# Patient Record
Sex: FEMALE | Race: WHITE | NOT HISPANIC OR LATINO
[De-identification: names, ages, dates, MRNs, and addresses within clinical notes are randomized per-mention and may not be internally consistent; named-entity substitution may affect disease eponyms.]

---

## 2017-06-17 ENCOUNTER — HEALTH MAINTENANCE LETTER (OUTPATIENT)
Age: 37
End: 2017-06-17

## 2018-05-24 ENCOUNTER — RECORDS - HEALTHEAST (OUTPATIENT)
Dept: LAB | Facility: CLINIC | Age: 38
End: 2018-05-24

## 2018-05-26 LAB — BACTERIA SPEC CULT: NORMAL

## 2019-02-18 ENCOUNTER — TELEPHONE (OUTPATIENT)
Dept: FAMILY MEDICINE | Facility: CLINIC | Age: 39
End: 2019-02-18

## 2019-02-18 ENCOUNTER — OFFICE VISIT (OUTPATIENT)
Dept: FAMILY MEDICINE | Facility: CLINIC | Age: 39
End: 2019-02-18

## 2019-02-18 ENCOUNTER — TRANSFERRED RECORDS (OUTPATIENT)
Dept: FAMILY MEDICINE | Facility: CLINIC | Age: 39
End: 2019-02-18

## 2019-02-18 VITALS
TEMPERATURE: 98.4 F | OXYGEN SATURATION: 98 % | HEIGHT: 62 IN | HEART RATE: 93 BPM | SYSTOLIC BLOOD PRESSURE: 124 MMHG | BODY MASS INDEX: 26.65 KG/M2 | DIASTOLIC BLOOD PRESSURE: 74 MMHG | WEIGHT: 144.8 LBS

## 2019-02-18 DIAGNOSIS — R03.0 ELEVATED BLOOD PRESSURE READING WITHOUT DIAGNOSIS OF HYPERTENSION: ICD-10-CM

## 2019-02-18 DIAGNOSIS — Z01.419 ENCOUNTER FOR GYNECOLOGICAL EXAMINATION WITHOUT ABNORMAL FINDING: Primary | ICD-10-CM

## 2019-02-18 DIAGNOSIS — E66.3 OVERWEIGHT: ICD-10-CM

## 2019-02-18 PROBLEM — Z71.89 ACP (ADVANCE CARE PLANNING): Status: ACTIVE | Noted: 2019-02-18

## 2019-02-18 PROCEDURE — 99386 PREV VISIT NEW AGE 40-64: CPT | Performed by: PHYSICIAN ASSISTANT

## 2019-02-18 SDOH — HEALTH STABILITY: MENTAL HEALTH: HOW OFTEN DO YOU HAVE A DRINK CONTAINING ALCOHOL?: 2-4 TIMES A MONTH

## 2019-02-18 SDOH — HEALTH STABILITY: MENTAL HEALTH: HOW MANY STANDARD DRINKS CONTAINING ALCOHOL DO YOU HAVE ON A TYPICAL DAY?: 1 OR 2

## 2019-02-18 ASSESSMENT — MIFFLIN-ST. JEOR: SCORE: 1272.12

## 2019-02-18 NOTE — PROGRESS NOTES
SUBJECTIVE:   CC: Mireya Pavon is an 39 year old woman who presents for preventive health visit.     Healthy Habits:    Do you get at least three servings of calcium containing foods daily (dairy, green leafy vegetables, etc.)? yes    Amount of exercise or daily activities, outside of work: soccer, basketball coaching, frisbee, running, water skiing    Problems taking medications regularly not applicable    Medication side effects: No    Have you had an eye exam in the past two years? no    Do you see a dentist twice per year? yes      Pt has IUD - 2 years ago put in at [facility]   No menses      -------------------------------------    Today's PHQ-2 Score:   PHQ-2 ( 1999 Pfizer) 2/18/2019   Q1: Little interest or pleasure in doing things 0   Q2: Feeling down, depressed or hopeless 0   PHQ-2 Score 0       Abuse: Current or Past(Physical, Sexual or Emotional)- No  Do you feel safe in your environment? Yes    Social History     Tobacco Use     Smoking status: Never Smoker     Smokeless tobacco: Never Used   Substance Use Topics     Alcohol use: Yes     Alcohol/week: 0.5 oz     Types: 1 Standard drinks or equivalent per week     Frequency: 2-4 times a month     Drinks per session: 1 or 2     If you drink alcohol do you typically have >3 drinks per day or >7 drinks per week? No                     Reviewed orders with patient.  Reviewed health maintenance and updated orders accordingly - Yes  Labs reviewed in EPIC  BP Readings from Last 3 Encounters:   02/18/19 124/74   12/26/14 (!) 142/98   11/29/12 110/62    Wt Readings from Last 3 Encounters:   02/18/19 65.7 kg (144 lb 12.8 oz)   12/26/14 63.5 kg (140 lb)   11/29/12 64.4 kg (142 lb)                  Patient Active Problem List   Diagnosis     Health Care Home     ACP (advance care planning)     Elevated blood pressure reading without diagnosis of hypertension     Overweight     Past Surgical History:   Procedure Laterality Date     wisdom teeth          Social History     Tobacco Use     Smoking status: Never Smoker     Smokeless tobacco: Never Used   Substance Use Topics     Alcohol use: Yes     Alcohol/week: 0.5 oz     Types: 1 Standard drinks or equivalent per week     Frequency: 2-4 times a month     Drinks per session: 1 or 2     Family History   Problem Relation Age of Onset     No Known Problems Brother      Breast Cancer Mother 41     High cholesterol Father      Diabetes Paternal Grandmother      Allergies Daughter      Asthma Daughter          Current Outpatient Medications   Medication Sig Dispense Refill     levonorgestrel (MIRENA) 20 MCG/24HR IUD 1 Device by Intrauterine route       No Known Allergies  Recent Labs   Lab Test 11/29/12  1817   ALT 7   CR 0.71   GFRESTIMATED 112   POTASSIUM 4.2        Mammogram Screening: Patient under age 50, mutual decision reflected in health maintenance.      Pertinent mammograms are reviewed under the imaging tab.  History of abnormal Pap smear: NO - age 30- 65 PAP every 3 years recommended     Reviewed and updated as needed this visit by clinical staff  Tobacco  Allergies  Meds  Problems         Reviewed and updated as needed this visit by Provider        No past medical history on file.   Past Surgical History:   Procedure Laterality Date     wisdom teeth         ROS:  CONSTITUTIONAL: NEGATIVE for fever, chills, change in weight  INTEGUMENTARU/SKIN: NEGATIVE for worrisome rashes, moles or lesions  EYES: NEGATIVE for vision changes or irritation  ENT: NEGATIVE for ear, mouth and throat problems  RESP: NEGATIVE for significant cough or SOB  BREAST: NEGATIVE for masses, tenderness or discharge  CV: NEGATIVE for chest pain, palpitations or peripheral edema  GI: NEGATIVE for nausea, abdominal pain, heartburn, or change in bowel habits  : NEGATIVE for unusual urinary or vaginal symptoms. Periods are regular.  MUSCULOSKELETAL: NEGATIVE for significant arthralgias or myalgia  NEURO: NEGATIVE for weakness, dizziness  "or paresthesias  PSYCHIATRIC: NEGATIVE for changes in mood or affect    OBJECTIVE:   /74 (BP Location: Left arm, Patient Position: Sitting, Cuff Size: Adult Large)   Pulse 93   Temp 98.4  F (36.9  C) (Oral)   Ht 1.562 m (5' 1.5\")   Wt 65.7 kg (144 lb 12.8 oz)   SpO2 98%   BMI 26.92 kg/m       EXAM:  GENERAL: healthy, alert and no distress  EYES: Eyes grossly normal to inspection, PERRL and conjunctivae and sclerae normal  HENT: ear canals and TM's normal, nose and mouth without ulcers or lesions  NECK: no adenopathy, no asymmetry, masses, or scars and thyroid normal to palpation  RESP: lungs clear to auscultation - no rales, rhonchi or wheezes  BREAST: normal without masses, tenderness or nipple discharge and no palpable axillary masses or adenopathy  CV: regular rate and rhythm, normal S1 S2, no S3 or S4, no murmur, click or rub, no peripheral edema and peripheral pulses strong  ABDOMEN: soft, nontender, no hepatosplenomegaly, no masses and bowel sounds normal   (female): normal female external genitalia, normal urethral meatus, vaginal mucosa pink, moist, well rugated, and normal adnexa/uterus without masses or discharge - cervix is erythematous and has friable appearance - no spontaneous bleeding with pap collection  MS: no gross musculoskeletal defects noted, no edema  SKIN: no suspicious lesions or rashes  NEURO: Normal strength and tone, mentation intact and speech normal  PSYCH: mentation appears normal, affect normal/bright        ASSESSMENT/PLAN:   (Z01.419) Encounter for gynecological examination without abnormal finding  (primary encounter diagnosis)  Comment: annual  Plan: levonorgestrel (MIRENA) 20 MCG/24HR IUD,         ThinPrep Pap and HPV (mRNA E6/E7)         ([facility]), VENOUS COLLECTION, Lipid Profile,         Glucose Fasting (BFP)            (R03.0) Elevated blood pressure reading without diagnosis of hypertension  Comment: new  Plan: Handout on HTN provided    (E66.3) " "Overweight  Comment: stable      COUNSELING:   Special attention given to:        Regular exercise       Healthy diet/nutrition       Family planning    BP Readings from Last 1 Encounters:   02/18/19 124/74     Estimated body mass index is 26.92 kg/m  as calculated from the following:    Height as of this encounter: 1.562 m (5' 1.5\").    Weight as of this encounter: 65.7 kg (144 lb 12.8 oz).    BP Screening:   Last 3 BP Readings:    BP Readings from Last 3 Encounters:   02/18/19 124/74   12/26/14 (!) 142/98   11/29/12 110/62       The following was recommended to the patient:  Re-screen BP within a year and recommended lifestyle modifications  Weight management plan: Discussed healthy diet and exercise guidelines     reports that  has never smoked. she has never used smokeless tobacco.      Counseling Resources:  ATP IV Guidelines  Pooled Cohorts Equation Calculator  Breast Cancer Risk Calculator  FRAX Risk Assessment  ICSI Preventive Guidelines  Dietary Guidelines for Americans, 2010  USDA's MyPlate  ASA Prophylaxis  Lung CA Screening    ZELALEM Ferguson  [facility]  "

## 2019-02-18 NOTE — NURSING NOTE
Patient is here for a full physical exam.    Pre-Visit Screening :  Immunizations : up to date  Colon Screening : NA  Mammogram: NA  Asthma Action Test/Plan : NA  PHQ9 :  PHQ 2 done today  GAD7 :  NA  Patient's  BMI Body mass index is 26.92 kg/m .  Questioned patient about current smoking habits.  Pt. has never smoked.  OK to leave a detailed voice message regarding today's visit Yes, phone # [phone]      ETOH screening:  Questions:  1-How often do you have a drink containing alcohol?                             1 times per week  2-How many drinks containing alcohol do you have on a typical day when you are         Drinking?                              2   3- How often do you have 5 or more drinks on one occasion?                              Rarely

## 2019-02-19 LAB
CLINICAL HISTORY - QUEST: NORMAL
COMMENT - QUEST: NORMAL
CYTOTECHNOLOGIST - QUEST: NORMAL
DESCRIPTIVE DIAGNOSIS - QUEST: NORMAL
LAST PAP DX - QUEST: NORMAL
LMP - QUEST: NORMAL
PREV BX DX - QUEST: NORMAL
SOURCE: NORMAL
STATEMENT OF ADEQUACY - QUEST: NORMAL

## 2019-03-02 DIAGNOSIS — Z01.419 ENCOUNTER FOR GYNECOLOGICAL EXAMINATION WITHOUT ABNORMAL FINDING: ICD-10-CM

## 2019-03-02 LAB — GLUCOSE SERPL-MCNC: 80 MG/DL (ref 60–99)

## 2019-03-02 PROCEDURE — 82947 ASSAY GLUCOSE BLOOD QUANT: CPT | Performed by: PHYSICIAN ASSISTANT

## 2019-03-02 PROCEDURE — 36415 COLL VENOUS BLD VENIPUNCTURE: CPT | Performed by: PHYSICIAN ASSISTANT

## 2019-03-03 LAB
CHOLEST SERPL-MCNC: 192 MG/DL
CHOLEST/HDLC SERPL: 3.7 (CALC)
HDLC SERPL-MCNC: 52 MG/DL
LDLC SERPL CALC-MCNC: 120 MG/DL (CALC)
NONHDLC SERPL-MCNC: 140 MG/DL (CALC)
TRIGL SERPL-MCNC: 101 MG/DL

## 2019-04-22 ENCOUNTER — APPOINTMENT (OUTPATIENT)
Dept: GENERAL RADIOLOGY | Facility: CLINIC | Age: 39
End: 2019-04-22
Attending: PHYSICIAN ASSISTANT
Payer: COMMERCIAL

## 2019-04-22 ENCOUNTER — HOSPITAL ENCOUNTER (EMERGENCY)
Facility: CLINIC | Age: 39
Discharge: HOME | End: 2019-04-22
Admitting: PHYSICIAN ASSISTANT
Payer: COMMERCIAL

## 2019-04-22 VITALS
BODY MASS INDEX: 26.98 KG/M2 | SYSTOLIC BLOOD PRESSURE: 119 MMHG | TEMPERATURE: 98.2 F | OXYGEN SATURATION: 99 % | DIASTOLIC BLOOD PRESSURE: 70 MMHG | WEIGHT: 146.61 LBS | RESPIRATION RATE: 18 BRPM | HEIGHT: 62 IN

## 2019-04-22 DIAGNOSIS — S86.001A ACHILLES TENDON INJURY, RIGHT, INITIAL ENCOUNTER: ICD-10-CM

## 2019-04-22 PROCEDURE — 99284 EMERGENCY DEPT VISIT MOD MDM: CPT | Mod: 25

## 2019-04-22 PROCEDURE — 73610 X-RAY EXAM OF ANKLE: CPT | Mod: RT

## 2019-04-22 PROCEDURE — 29515 APPLICATION SHORT LEG SPLINT: CPT | Mod: RT

## 2019-04-22 ASSESSMENT — MIFFLIN-ST. JEOR: SCORE: 1288.25

## 2019-04-22 ASSESSMENT — ENCOUNTER SYMPTOMS: MYALGIAS: 1

## 2019-04-22 NOTE — ED AVS SNAPSHOT
[facility] Emergency Department  [address]  [facility] [address]  Phone:  [phone]  Fax:  [phone]                                    Mireya Pavon   MRN: [id number]    Department:  [facility] Emergency Department   Date of Visit:  4/22/2019           After Visit Summary Signature Page    I have received my discharge instructions, and my questions have been answered. I have discussed any challenges I see with this plan with the nurse or doctor.    ..........................................................................................................................................  Patient/Patient Representative Signature      ..........................................................................................................................................  Patient Representative Print Name and Relationship to Patient    ..................................................               ................................................  Date                                   Time    ..........................................................................................................................................  Reviewed by Signature/Title    ...................................................              ..............................................  Date                                               Time          22EPIC Rev 08/18

## 2019-04-23 NOTE — ED PROVIDER NOTES
"  History     Chief Complaint:  Right Lower Leg Pain    The history is provided by the patient.      Mireya Pavon is a 39 year old female who presents to the emergency department with her  for evaluation of right lower leg pain. Prior to arrival, the patient reports the sudden onset of intense pain to the back of her right calf while playing soccer. She said it felt like she got kicked, but believes she wasn't. She then fell to her knees, but was unable to bear any weight on it as she tried to stand up. When she got home, she took Advil for the pain, which seemed to help a little bit. Her worsening pain prompted the patient to seek evaluation in the emergency room today.    She denies any ankle, foot pain, or past injury to that leg. She denies taking any steroids or antibiotics.     Allergies:  The patient has no known drug allergies.     Medications:    Mirena      Past Medical History:    Hypertension     Past Surgical History:    [place] teeth extraction    Family History:    Breast cancer  Hyperlipidemia  Allergies  Asthma    Social History:  Negative for tobacco use.  Positive for alcohol use.  Negative for drug use.  Marital Status:        Review of Systems   Musculoskeletal: Positive for myalgias (Right calf).   All other systems reviewed and are negative.    Physical Exam     Patient Vitals for the past 24 hrs:   BP Temp Temp src Heart Rate Resp SpO2 Height Weight   04/22/19 2139 126/72 98.2  F (36.8  C) Oral 88 16 99 % 1.575 m (5' 2\") 66.5 kg (146 lb 9.7 oz)     Physical Exam  General: Alert and oriented.    Head: Normocephalic. External ears and nose normal.    Eyes: Pupils equal and round.  Normal tracking.    Pulmonary/Chest: Effort and rate normal    MSK:   Focused exam of the right ankle and foot shows no obvious effusion or erythema. Normal active and passive ROM in ankle and foot. No tenderness to palpation over distal tibia, medial or lateral malleolus.  Negative squeeze test for " syndesmosis injury.   Tenderness to palpation over Achilles tendon.  Slight Weakness of plantar flexion of foot.  Haines test normal.    No tenderness to palpation over patella or proximal tibia/fibula, patella with normal ROM in knee.     SKIN:  Warm and dry with strong DP or posterior tibial pulses and normal capillary refill.    NEURO:  Normal strength and sensation throughout the foot and toes.  Normal strength in ankle.    PSYCH:  Normal affect    Emergency Department Course   Imaging:  XR Ankle, Right, G/E 3 views:   No evidence for acute fracture or dislocation involving the right ankle. The mortise is intact. As per radiology.     Procedures:   Splint Placement    PLACEMENT: Custom Ortho glass splint was applied to the right lower extremity and after placement I checked and adjusted the fit to ensure proper positioning. Posterior ankle splint was applied with patient in 15 degrees of plantar flexion.  The patient was more comfortable with the splint in place. Sensation and circulation are intact after splint placement.     Emergency Department Course:  Nursing notes and vitals reviewed. 2145 I performed an exam of the patient as documented above.     The patient was sent for an ankle x-ray while in the emergency department, findings above.     2240 I rechecked the patient and discussed the results of her workup thus far. I placed an Ortho glass splint, see procedure note above.     Findings and plan explained to the Patient and spouse. Patient discharged home with instructions regarding supportive care, medications, and reasons to return. The importance of close follow-up was reviewed.     I personally answered all related questions prior to discharge.      Impression & Plan    Medical Decision Making:  Mireya Pavon is a very pleasant 39 year old year old female who presents to the emergency department with concern of pain over the achilles tendon. No known trauma. No recent antibiotic  (flouroquinolone) use. No warmth to suggest cellulitis. No bony tenderness anywhere to suggest fracture. Vasculature is normal. Pain likely suggest of tear of Achilles Tendon. I recommended RICE treatment. Will follow up with ortho within 1 week for re-evaluation. She may need an MRI. Crutches given. she was placed in an Ortho glass splint. I reviewed what compartment syndrome and fracture blisters are and if she has any signs of these, particularly increased pain, she should return for reevaluation.    The treatment plan was discussed with the patient and they expressed understanding of this plan and consented to the plan.  In addition, the patient will return to the emergency department if their symptoms persist, worsen, if new symptoms arise or if there is any concern as other pathology may be present that is not evident at this time. They also understand the importance of close follow up in the clinic and if unable to do so will return to the emergency department for a reevaluation. All questions were answered.    Critical Care time:  none    Diagnosis:    ICD-10-CM    1. Achilles tendon injury, right, initial encounter S86.001A        Disposition:  discharged to home with her     Scribe Disclosure:  I, Bella Brandon, am serving as a scribe on 4/22/2019 at 9:55 PM to personally document services performed by Hussein Calderon PA-C, based on my observations and the provider's statements to me.     Bella Brandon  4/22/2019   [facility] EMERGENCY DEPARTMENT       Hussein Calderon PA-C  04/23/19 3511

## 2019-10-01 ENCOUNTER — HEALTH MAINTENANCE LETTER (OUTPATIENT)
Age: 39
End: 2019-10-01

## 2020-03-02 ENCOUNTER — TRANSFERRED RECORDS (OUTPATIENT)
Dept: FAMILY MEDICINE | Facility: CLINIC | Age: 40
End: 2020-03-02

## 2020-03-22 ENCOUNTER — HEALTH MAINTENANCE LETTER (OUTPATIENT)
Age: 40
End: 2020-03-22

## 2021-01-15 ENCOUNTER — HEALTH MAINTENANCE LETTER (OUTPATIENT)
Age: 41
End: 2021-01-15

## 2021-05-15 ENCOUNTER — HEALTH MAINTENANCE LETTER (OUTPATIENT)
Age: 41
End: 2021-05-15

## 2021-05-28 ENCOUNTER — RECORDS - HEALTHEAST (OUTPATIENT)
Dept: ADMINISTRATIVE | Facility: CLINIC | Age: 41
End: 2021-05-28

## 2021-09-04 ENCOUNTER — HEALTH MAINTENANCE LETTER (OUTPATIENT)
Age: 41
End: 2021-09-04

## 2022-06-11 ENCOUNTER — HEALTH MAINTENANCE LETTER (OUTPATIENT)
Age: 42
End: 2022-06-11

## 2022-10-16 ENCOUNTER — HEALTH MAINTENANCE LETTER (OUTPATIENT)
Age: 42
End: 2022-10-16

## 2023-06-17 ENCOUNTER — HEALTH MAINTENANCE LETTER (OUTPATIENT)
Age: 43
End: 2023-06-17

## 2024-07-09 ENCOUNTER — LAB REQUISITION (OUTPATIENT)
Dept: LAB | Facility: CLINIC | Age: 44
End: 2024-07-09
Payer: COMMERCIAL

## 2024-07-09 DIAGNOSIS — Z12.4 ENCOUNTER FOR SCREENING FOR MALIGNANT NEOPLASM OF CERVIX: ICD-10-CM

## 2024-07-09 PROCEDURE — G0145 SCR C/V CYTO,THINLAYER,RESCR: HCPCS | Mod: ORL | Performed by: OBSTETRICS & GYNECOLOGY

## 2024-07-09 PROCEDURE — 87624 HPV HI-RISK TYP POOLED RSLT: CPT | Mod: ORL | Performed by: OBSTETRICS & GYNECOLOGY

## 2024-07-10 LAB
HPV HR 12 DNA CVX QL NAA+PROBE: NEGATIVE
HPV16 DNA CVX QL NAA+PROBE: NEGATIVE
HPV18 DNA CVX QL NAA+PROBE: NEGATIVE
HUMAN PAPILLOMA VIRUS FINAL DIAGNOSIS: NORMAL

## 2024-07-12 LAB
BKR LAB AP GYN ADEQUACY: NORMAL
BKR LAB AP GYN INTERPRETATION: NORMAL
PATH REPORT.COMMENTS IMP SPEC: NORMAL
PATH REPORT.COMMENTS IMP SPEC: NORMAL

## 2024-08-08 ENCOUNTER — OFFICE VISIT (OUTPATIENT)
Dept: FAMILY MEDICINE | Facility: CLINIC | Age: 44
End: 2024-08-08

## 2024-08-08 VITALS
SYSTOLIC BLOOD PRESSURE: 114 MMHG | TEMPERATURE: 98.2 F | OXYGEN SATURATION: 99 % | DIASTOLIC BLOOD PRESSURE: 70 MMHG | HEIGHT: 62 IN | HEART RATE: 69 BPM | BODY MASS INDEX: 30.36 KG/M2 | WEIGHT: 165 LBS

## 2024-08-08 DIAGNOSIS — Z13.29 SCREENING FOR THYROID DISORDER: ICD-10-CM

## 2024-08-08 DIAGNOSIS — Z13.220 SCREENING FOR LIPID DISORDERS: ICD-10-CM

## 2024-08-08 DIAGNOSIS — Z13.228 SCREENING FOR METABOLIC DISORDER: ICD-10-CM

## 2024-08-08 DIAGNOSIS — Z23 ENCOUNTER FOR IMMUNIZATION: ICD-10-CM

## 2024-08-08 DIAGNOSIS — Z12.11 SCREENING FOR MALIGNANT NEOPLASM OF COLON: ICD-10-CM

## 2024-08-08 DIAGNOSIS — Z80.3 FAMILY HISTORY OF MALIGNANT NEOPLASM OF BREAST: ICD-10-CM

## 2024-08-08 DIAGNOSIS — Z00.00 WELL WOMAN EXAM WITHOUT GYNECOLOGICAL EXAM: Primary | ICD-10-CM

## 2024-08-08 DIAGNOSIS — Z12.31 ENCOUNTER FOR SCREENING MAMMOGRAM FOR MALIGNANT NEOPLASM OF BREAST: ICD-10-CM

## 2024-08-08 DIAGNOSIS — Z82.49 FAMILY HISTORY OF HYPERTROPHIC CARDIOMYOPATHY: ICD-10-CM

## 2024-08-08 LAB
ALBUMIN SERPL-MCNC: 4.5 G/DL (ref 3.6–5.1)
ALP SERPL-CCNC: 79 U/L (ref 33–130)
ALT 1742-6: 15 U/L (ref 0–32)
AST 1920-8: 16 U/L (ref 0–35)
BILIRUB SERPL-MCNC: 1.1 MG/DL (ref 0.2–1.2)
BUN SERPL-MCNC: 10 MG/DL (ref 7–25)
BUN/CREATININE RATIO: 11 (ref 6–32)
CALCIUM SERPL-MCNC: 9.5 MG/DL (ref 8.6–10.3)
CHLORIDE SERPLBLD-SCNC: 105 MMOL/L (ref 98–110)
CHOLEST SERPL-MCNC: 185 MG/DL (ref 0–199)
CHOLEST/HDLC SERPL: 5 {RATIO} (ref 0–5)
CO2 SERPL-SCNC: 24.4 MMOL/L (ref 20–32)
CREAT SERPL-MCNC: 0.91 MG/DL (ref 0.6–1.3)
GLUCOSE SERPL-MCNC: 92 MG/DL (ref 60–99)
HDLC SERPL-MCNC: 41 MG/DL (ref 40–150)
LDLC SERPL CALC-MCNC: 120 MG/DL
POTASSIUM SERPL-SCNC: 4.37 MMOL/L (ref 3.5–5.3)
PROT SERPL-MCNC: 6.2 G/DL (ref 6.1–8.1)
SODIUM SERPL-SCNC: 137 MMOL/L (ref 135–146)
TRIGL SERPL-MCNC: 118 MG/DL (ref 0–149)

## 2024-08-08 PROCEDURE — 90715 TDAP VACCINE 7 YRS/> IM: CPT

## 2024-08-08 PROCEDURE — 80061 LIPID PANEL: CPT

## 2024-08-08 PROCEDURE — 90471 IMMUNIZATION ADMIN: CPT

## 2024-08-08 PROCEDURE — 99396 PREV VISIT EST AGE 40-64: CPT

## 2024-08-08 PROCEDURE — 36415 COLL VENOUS BLD VENIPUNCTURE: CPT

## 2024-08-08 PROCEDURE — 80053 COMPREHEN METABOLIC PANEL: CPT

## 2024-08-08 NOTE — PROGRESS NOTES
Preventive Care Visit  [facility]  Virginie Gustafson PA-C, Family Medicine  Aug 8, 2024       SUBJECTIVE:   Mireya is a 44 year old, presenting for the following:  New Patient (Re-establish care) and Physical (Fasting today, no concerns)      HPI    Mireya presents for her yearly physical.     Daily medications: None.     Concerns: Mireya's primary concerns today is whether or not she should be getting a yearly breast MRI for breast cancer screening. She has a family history of breast cancer in her mother, notes she was diagnosed when she was 47 and passed away when she was 62. She notes in the past she had an abnormal breast MRI and then needed left breast biopsy which was normal but this made her nervous to get another breast MRI. She has been doing yearly mammograms since which have all been normal. She goes to the [facility]. She has seen an oncologist at [place] [facility] in the past and was negative for the BRCA gene.     Mireya also notes there is a family history of hypertrophic cardiomyopathy in her paternal uncle, paternal aunt, and paternal grandfather. She notes her dad has been evaluated but is negative. She notes her paternal grandfather passed away from this but that her paternal aunt is mostly asymptomatic. She also notes her paternal uncle struggles with shortness of breath. Mireya herself has never been evaluated for this before and denies any symptoms of chest pain, SOB, palpitations, or any swelling in her legs.     Past medical history and daily medications reviewed. Reviewed past medical history, surgical history, family history, and social history below.     Social history:  -Diet: Tries to eat a well balanced diet, primarily has chicken and turkey for protein, good amount of fruits and vegetables, has cheese and yogurt for calcium.   -Water: Knows she should drink more water, has 2 cans of diet coke daily as well.   -Exercise: Active, plays ultimate frisbee one day a week, has  also been going on walks and runs this summer 1-2 times a week.   -Sleep: No concerns.   -Daily vitamins: None.   -Dentist: Twice a year.   -Eye doctor: Has not gone in a while.   -Smoking: None.   -Alcohol: Socially.   -LMP: N/A, has mirena IUD.   -Works at a school.      Screenings:  -Immunizations: Due for TDAP.   -Lab work: CMP, lipid, TSH/T4.   -Pap smear: Up to date, due 07/2029.   -Mammogram: Due, order placed.   -Colonoscopy: Due, order placed.     Have you ever done Advance Care Planning? (For example, a Health Directive, POLST, or a discussion with a medical provider or your loved ones about your wishes): No, advance care planning information given to patient to review.  Advanced care planning was discussed at today's visit.    Social History     Tobacco Use    Smoking status: Never     Passive exposure: Never    Smokeless tobacco: Never   Substance Use Topics    Alcohol use: Yes     Alcohol/week: 1.0 - 2.0 standard drink of alcohol     Types: 1 - 2 Standard drinks or equivalent per week     Comment: sangeetha, occasionally     Reviewed orders with patient.  Reviewed health maintenance and updated orders accordingly - Yes    Lab work is in process.     Breast Cancer Screening: Any new diagnosis of family breast, ovarian, or bowel cancer? No     Mammogram Screening - Mammogram every 1-2 years updated in Health Maintenance based on mutual decision making. Pertinent mammograms are reviewed under the imaging tab. Recommend patient also get yearly breast MRI's as recommended by her previous oncologist, she will reach out to [facility] to try to get this scheduled however discussed she may need to re-establish care with oncology again to have this order placed.     History of abnormal Pap smear: No - age 30- 64 PAP with HPV every 5 years recommended.         Latest Ref Rng & Units 7/9/2024    10:43 AM   PAP / HPV   PAP  Negative for Intraepithelial Lesion or Malignancy (NILM)    HPV 16 DNA Negative  "Negative    HPV 18 DNA Negative Negative    Other HR HPV Negative Negative          Latest Ref Rng & Units 7/9/2024    10:43 AM   PAP / HPV   PAP  Negative for Intraepithelial Lesion or Malignancy (NILM)    HPV 16 DNA Negative Negative    HPV 18 DNA Negative Negative    Other HR HPV Negative Negative      Reviewed and updated as needed this visit by clinical staff   Tobacco  Allergies  Meds   Med Hx  Surg Hx  Fam Hx  Soc Hx      Reviewed and updated as needed this visit by Provider   Tobacco  Allergies  Meds   Med Hx  Surg Hx  Fam Hx  Soc Hx Sexual   Activity        Review of Systems  CONSTITUTIONAL: NEGATIVE for fever, chills, change in weight  INTEGUMENTARY/SKIN: NEGATIVE for worrisome rashes, moles or lesions  EYES: NEGATIVE for vision changes or irritation  ENT/MOUTH: NEGATIVE for ear, mouth and throat problems  RESP: NEGATIVE for significant cough or SOB  BREAST: NEGATIVE for masses, tenderness or discharge  CV: NEGATIVE for chest pain, palpitations or peripheral edema  GI: NEGATIVE for nausea, abdominal pain, heartburn, or change in bowel habits  : NEGATIVE for frequency, dysuria, or hematuria  MUSCULOSKELETAL: NEGATIVE for significant arthralgias or myalgia  NEURO: NEGATIVE for weakness, dizziness or paresthesias  ENDOCRINE: NEGATIVE for temperature intolerance, skin/hair changes  HEME: NEGATIVE for bleeding problems  PSYCHIATRIC: NEGATIVE for changes in mood or affect    OBJECTIVE:   /70 (BP Location: Right arm, Patient Position: Sitting, Cuff Size: Adult Large)   Pulse 69   Temp 98.2  F (36.8  C) (Temporal)   Ht 1.575 m (5' 2\")   Wt 74.8 kg (165 lb)   SpO2 99%   BMI 30.18 kg/m     Estimated body mass index is 30.18 kg/m  as calculated from the following:    Height as of this encounter: 1.575 m (5' 2\").    Weight as of this encounter: 74.8 kg (165 lb).    Physical Exam  GENERAL: alert and no distress  EYES: Eyes grossly normal to inspection, PERRL and conjunctivae and sclerae " normal  HENT: ear canals and TM's normal, nose and mouth without ulcers or lesions  NECK: no adenopathy, no asymmetry, masses, or scars  RESP: lungs clear to auscultation - no rales, rhonchi or wheezes  BREAST: declines, see's OBGYN  CV: regular rate and rhythm, normal S1 S2, no S3 or S4, no murmur, click or rub, no peripheral edema  ABDOMEN: soft, nontender, no hepatosplenomegaly, no masses and bowel sounds normal   (female): declines, see's OBGYN  MS: no gross musculoskeletal defects noted, no edema  SKIN: no suspicious lesions or rashes  NEURO: Normal strength and tone, mentation intact and speech normal  PSYCH: mentation appears normal, affect normal/bright    Lab work pending.     ASSESSMENT/PLAN:     Well woman exam without gynecological exam  - Mireya is overall doing well today. Encouraged healthy eating habits, daily exercise, reviewed screenings and immunizations, etc.     Family history of malignant neoplasm of breast  - Discussed with Mireya recommend she restart having yearly breast MRI's in addition to her yearly mammograms as instructed by her previous oncologist. She is in agreement and will reach out to [facility] to try and schedule a breast MRI in 6 months. Discussed she will likely need to re-establish care with an oncologist at [facility] or could see [place] [facility] here in [place]. Order for mammogram placed today.     Family history of hypertrophic cardiomyopathy  - Discussed with Mireya would recommend having her have an echocardiogram completed to rule in/out hypertrophic cardiomyopathy. She is asymptomatic from a cardiac standpoint however notes her paternal aunt was also asymptomatic and it was not known she had it until she had the echo done. Discussed with patient if insurance does not cover echo then will plan for her to have a consult with a cardiologist. She is in agreement with this plan.   - Echocardiogram Complete; Future    Screening for metabolic disorder  - Labs pending,  patient will be notified of results.   - VENOUS COLLECTION  - Comprehensive Metobolic Panel (BFP)    Screening for lipid disorders  - Labs pending, patient will be notified of results.   - VENOUS COLLECTION  - Lipid Panel (BFP)    Screening for thyroid disorder  - Labs pending, patient will be notified of results.   - TSH WITH FREE T4 REFLEX ([facility])    Encounter for screening mammogram for malignant neoplasm of breast  - Order placed.   - MA Screening Bilateral w/ [facility]  - Radiology Referral (Affiliate Use Only)    Screening for malignant neoplasm of colon  - Order placed.   - Colonoscopy Screening  Referral    Encounter for immunization  - TDAP updated.   - TDAP VACCINE (Adacel, Boostrix)  [[id number]]    Counseling  Reviewed preventive health counseling, as reflected in patient instructions       Regular exercise       Healthy diet/nutrition       Vision screening       Immunizations  Vaccinated for: TDAP         Alcohol Use       Osteoporosis prevention/bone health       Colorectal Cancer Screening       Consider Hep C screening for all patients one time for ages 18-79 years       Advance Care Planning    She reports that she has never smoked. She has never been exposed to tobacco smoke. She has never used smokeless tobacco.            Signed Electronically by: Virginie Gustafson PA-C

## 2024-08-08 NOTE — PATIENT INSTRUCTIONS
Thanks for coming in today Mireya.     I will send you a my chart message with your lab results.     Recommend taking vitamin D3 1000 international unit(s) daily and seeing an eye doctor every few years.    Echocardiogram order placed to screen for hypertrophic cardiomyopathy. We will wait to see if insurance will cover this. If not covered, we can have you see cardiology for a consult.     Colonoscopy and mammogram referrals placed. Please see if you can schedule a breast MRI in 6 months when you schedule your mammogram or if you need re-establish care with oncology.     Please let me know if you have any questions or concerns.

## 2024-08-08 NOTE — NURSING NOTE
Chief Complaint   Patient presents with    New Patient     Re-establish care    Physical     Fasting today, no concerns     Pre-visit Screening:  Immunizations:  not up to date - tdap today  Colonoscopy:  is due and ordered today  Mammogram: is due and ordered today  Asthma Action Test/Plan:  NA  PHQ9:  NA  GAD7:  NA  Questioned patient about current smoking habits Pt. has never smoked.  Ok to leave detailed message on voice mail for today's visit only Yes, phone # [phone]

## 2024-08-09 LAB — TSH SERPL-ACNC: 0.56 MIU/L

## 2024-10-19 ENCOUNTER — HEALTH MAINTENANCE LETTER (OUTPATIENT)
Age: 44
End: 2024-10-19